# Patient Record
Sex: MALE | Race: AMERICAN INDIAN OR ALASKA NATIVE
[De-identification: names, ages, dates, MRNs, and addresses within clinical notes are randomized per-mention and may not be internally consistent; named-entity substitution may affect disease eponyms.]

---

## 2018-06-13 ENCOUNTER — HOSPITAL ENCOUNTER (EMERGENCY)
Dept: HOSPITAL 42 - ED | Age: 33
LOS: 1 days | Discharge: HOME | End: 2018-06-14
Payer: SELF-PAY

## 2018-06-13 VITALS — TEMPERATURE: 98.3 F

## 2018-06-13 DIAGNOSIS — F32.9: Primary | ICD-10-CM

## 2018-06-13 LAB
ALBUMIN SERPL-MCNC: 4.4 G/DL (ref 3–4.8)
ALBUMIN/GLOB SERPL: 1.4 {RATIO} (ref 1.1–1.8)
ALT SERPL-CCNC: 32 U/L (ref 7–56)
APAP SERPL-MCNC: < 10 UG/ML (ref 10–20)
APPEARANCE UR: CLEAR
AST SERPL-CCNC: 22 U/L (ref 17–59)
BASOPHILS # BLD AUTO: 0.01 K/MM3 (ref 0–2)
BASOPHILS NFR BLD: 0.1 % (ref 0–3)
BILIRUB UR-MCNC: NEGATIVE MG/DL
BUN SERPL-MCNC: 14 MG/DL (ref 7–21)
CALCIUM SERPL-MCNC: 9.4 MG/DL (ref 8.4–10.5)
COLOR UR: YELLOW
EOSINOPHIL # BLD: 0 10*3/UL (ref 0–0.7)
EOSINOPHIL NFR BLD: 0.3 % (ref 1.5–5)
ERYTHROCYTE [DISTWIDTH] IN BLOOD BY AUTOMATED COUNT: 12.3 % (ref 11.5–14.5)
GFR NON-AFRICAN AMERICAN: > 60
GLUCOSE UR STRIP-MCNC: NEGATIVE MG/DL
GRANULOCYTES # BLD: 6.56 10*3/UL (ref 1.4–6.5)
GRANULOCYTES NFR BLD: 74.3 % (ref 50–68)
HGB BLD-MCNC: 14.2 G/DL (ref 14–18)
LEUKOCYTE ESTERASE UR-ACNC: NEGATIVE LEU/UL
LYMPHOCYTES # BLD: 1.9 10*3/UL (ref 1.2–3.4)
LYMPHOCYTES NFR BLD AUTO: 21.3 % (ref 22–35)
MCH RBC QN AUTO: 29.2 PG (ref 25–35)
MCHC RBC AUTO-ENTMCNC: 33.6 G/DL (ref 31–37)
MCV RBC AUTO: 86.7 FL (ref 80–105)
MONOCYTES # BLD AUTO: 0.4 10*3/UL (ref 0.1–0.6)
MONOCYTES NFR BLD: 4 % (ref 1–6)
PH UR STRIP: 6 [PH] (ref 4.7–8)
PLATELET # BLD: 238 10^3/UL (ref 120–450)
PMV BLD AUTO: 11.5 FL (ref 7–11)
PROT UR STRIP-MCNC: NEGATIVE MG/DL
RBC # BLD AUTO: 4.87 10^6/UL (ref 3.5–6.1)
RBC # UR STRIP: NEGATIVE /UL
SALICYLATES SERPL-MCNC: < 1 MG/DL (ref 2–20)
SP GR UR STRIP: 1.01 (ref 1–1.03)
UROBILINOGEN UR STRIP-ACNC: 0.2 E.U./DL
WBC # BLD AUTO: 8.8 10^3/UL (ref 4.5–11)

## 2018-06-13 PROCEDURE — 85025 COMPLETE CBC W/AUTO DIFF WBC: CPT

## 2018-06-13 PROCEDURE — 99285 EMERGENCY DEPT VISIT HI MDM: CPT

## 2018-06-13 PROCEDURE — 71045 X-RAY EXAM CHEST 1 VIEW: CPT

## 2018-06-13 PROCEDURE — 93005 ELECTROCARDIOGRAM TRACING: CPT

## 2018-06-13 PROCEDURE — 81003 URINALYSIS AUTO W/O SCOPE: CPT

## 2018-06-13 PROCEDURE — 80053 COMPREHEN METABOLIC PANEL: CPT

## 2018-06-13 PROCEDURE — 90791 PSYCH DIAGNOSTIC EVALUATION: CPT

## 2018-06-13 PROCEDURE — 83735 ASSAY OF MAGNESIUM: CPT

## 2018-06-13 NOTE — ED PDOC
Arrival/HPI





- General


Historian: Patient





<Dasia Gilman A - Last Filed: 06/14/18 01:42>





<George Orellana - Last Filed: 06/14/18 06:48>





- General


Chief Complaint: Psychiatric Evaluation


Time Seen by Provider: 06/13/18 15:06





- History of Present Illness


Narrative History of Present Illness (Text): 





06/13/18 17:42


34yo male with no past medical history who present to Emergency department for 

psychiatric evaluation. Patient report depression and suicidal thoughts 

secondary to his financial situation. States he know that he can't commit 

suicide, but feels he needs therapy. He denies homicidal ideation, hallucination

, somatic complaint, any other complaint. (Dasia Gilman A)





Past Medical History





- Provider Review


Nursing Documentation Reviewed: Yes





- Infectious Disease


Hx of Infectious Diseases: None





- Cardiac


Hx Cardiac Disorders: No


Hx Hypertension: No





- Pulmonary


Hx Tuberculosis: No





- Neurological


HX Cerebrovascular Accident: No


Hx Seizures: No





- Hematological/Oncological


Hx Cancer: No





- Genitourinary/Gynecological


Hx Sexually Transmitted Diseases: No





- Psychiatric


Hx Substance Use: No





- Anesthesia


Hx Anesthesia: No





<Dasia Gilman A - Last Filed: 06/14/18 01:42>





Family/Social History





- Physician Review


Nursing Documentation Reviewed: Yes


Family/Social History: Unknown Family HX


Smoking Status: Unknown If Ever Smoked


Hx Alcohol Use: Yes


Frequency of alcohol use: Socially


Hx Substance Use: No


Substance used: MARIJUANA





<Dasia Gilman A - Last Filed: 06/14/18 01:42>





Allergies/Home Meds





<Dasia Gilman A - Last Filed: 06/14/18 01:42>





<George Orellana - Last Filed: 06/14/18 06:48>


Allergies/Adverse Reactions: 


Allergies





aspirin Allergy (Verified 06/13/18 15:16)


 RASH








Home Medications: 


 Home Meds











 Medication  Instructions  Recorded  Confirmed


 


No Known Home Med  06/13/18 06/13/18














Review of Systems





- Physician Review


All systems were reviewed & negative as marked: Yes





- Review of Systems


Constitutional: Normal


Eyes: Normal


ENT: Normal


Respiratory: Normal


Cardiovascular: Normal


Gastrointestinal: Normal


Genitourinary Male: Normal


Musculoskeletal: Normal


Skin: Normal


Neurological: Normal


Endocrine: Normal


Hemo/Lymphatic: Normal


Psychiatric: Depression, Suicidal Ideation





<Dasia Gilman A - Last Filed: 06/14/18 01:42>





Physical Exam


Vital Signs Reviewed: Yes


Temperature: Afebrile


Blood Pressure: Normal


Pulse: Regular


Respiratory Rate: Normal


Appearance: Positive for: Well-Appearing, Non-Toxic, Comfortable


Pain Distress: None


Mental Status: Positive for: Alert and Oriented X 3





- Systems Exam


Head: Present: Atraumatic, Normocephalic


Pupils: Present: PERRL


Extroacular Muscles: Present: EOMI


Conjunctiva: Present: Normal


Mouth: Present: Moist Mucous Membranes


Neck: Present: Normal Range of Motion


Respiratory/Chest: Present: Clear to Auscultation, Good Air Exchange.  No: 

Respiratory Distress, Accessory Muscle Use


Cardiovascular: Present: Regular Rate and Rhythm, Normal S1, S2.  No: Murmurs


Abdomen: No: Tenderness, Distention, Peritoneal Signs


Back: Present: Normal Inspection


Upper Extremity: Present: Normal Inspection.  No: Cyanosis, Edema


Lower Extremity: Present: Normal Inspection.  No: Edema


Neurological: Present: GCS=15, CN II-XII Intact, Speech Normal


Skin: Present: Warm, Dry, Normal Color.  No: Rashes


Psychiatric: Present: Alert, Oriented x 3, Normal Insight, Normal Concentration

, Depressed Mood





<Dasia Gilman A - Last Filed: 06/14/18 01:42>





Vital Signs











  Temp Pulse Resp BP Pulse Ox


 


 06/14/18 05:11   80  17  112/63  100


 


 06/14/18 00:18   71  18  115/68  100


 


 06/13/18 22:15   65  17  119/63  99


 


 06/13/18 19:00   68  17  122/74  99


 


 06/13/18 15:11  98.3 F  77  18  112/78  99














Medical Decision Making





<Dasia Gilman A - Last Filed: 06/14/18 01:42>





<George Orellana - Last Filed: 06/14/18 06:48>


ED Course and Treatment: 





06/13/18 17:45


PT present to Emergency department for stated history. He was hemodynamically 

stable. Lab was reviewed and pt was medically cleared for psychiatric 

evaluation.





EKG NSR @76bpm


Chest xray NAD





Pt was seen by the PES screener Sayra and he declined to signed in voluntarily

, so he will be screened by [facility].








06/14/18 01:42


PT remain calm and comfortable in Emergency department. He is pending screening 

from [facility].





Case was endorsed to Dr. Orellana to f/u and dispo accordingly. (Dasia Gilman)








06/14/18 06:46


Pt. was screened by [facility].Pt. not suicidal.Cleared for discharge.Pt. reevaluated 

as well by KACEY Null.Conferred with psychiatrist  who agreed on 

discharge and follow up [facility].Pt. in agreement (

George Orellana)





- Lab Interpretations


Lab Results: 











 06/13/18 16:58 





 06/13/18 16:58 





 Lab Results





06/13/18 16:58: Alcohol, Quantitative < 10


06/13/18 16:58: Salicylates < 1 L, Acetaminophen < 10.0 L


06/13/18 16:58: Urine Opiates Screen Negative, Urine Methadone Screen Negative, 

Ur Barbiturates Screen Negative, Ur Phencyclidine Scrn Negative, Ur 

Amphetamines Screen Negative, U Benzodiazepines Scrn Negative, U Oth Cocaine 

Metabols Negative, U Cannabinoids Screen Negative


06/13/18 16:58: Sodium 142, Potassium 4.0, Chloride 103, Carbon Dioxide 27, 

Anion Gap 16, BUN 14, Creatinine 1.1, Est GFR (African Amer) > 60, Est GFR (Non-

Af Amer) > 60, Random Glucose 95, Calcium 9.4, Magnesium 1.9, Total Bilirubin 

1.0, AST 22, ALT 32, Alkaline Phosphatase 67, Total Protein 7.6, Albumin 4.4, 

Globulin 3.2, Albumin/Globulin Ratio 1.4


06/13/18 16:58: Urine Color Yellow, Urine Appearance Clear, Urine pH 6.0, Ur 

Specific Gravity 1.015, Urine Protein Negative, Urine Glucose (UA) Negative, 

Urine Ketones Trace H, Urine Blood Negative, Urine Nitrate Negative, Urine 

Bilirubin Negative, Urine Urobilinogen 0.2, Ur Leukocyte Esterase Negative


06/13/18 16:58: WBC 8.8, RBC 4.87, Hgb 14.2, Hct 42.2, MCV 86.7, MCH 29.2, MCHC 

33.6, RDW 12.3, Plt Count 238, MPV 11.5 H, Gran % 74.3 H, Lymph % (Auto) 21.3 L

, Mono % (Auto) 4.0, Eos % (Auto) 0.3 L, Baso % (Auto) 0.1, Gran # 6.56 H, 

Lymph # (Auto) 1.9, Mono # (Auto) 0.4, Eos # (Auto) 0.0, Baso # (Auto) 0.01











- RAD Interpretation


Radiology Orders: 











06/13/18 15:32


CHEST PORTABLE [RAD] Stat 














Disposition/Present on Arrival





- Present on Arrival


Any Indicators Present on Arrival: No


History of DVT/PE: No


History of Uncontrolled Diabetes: No


Urinary Catheter: No


History of Decub. Ulcer: No


History Surgical Site Infection Following: None





- Disposition


Have Diagnosis and Disposition been Completed?: Yes





<Dasia Gilman - Last Filed: 06/14/18 01:42>





- Present on Arrival


Any Indicators Present on Arrival: No


History of DVT/PE: No


History of Uncontrolled Diabetes: No


Urinary Catheter: No


History of Decub. Ulcer: No


History Surgical Site Infection Following: None





- Disposition


Have Diagnosis and Disposition been Completed?: Yes


Disposition Time: 06:45


Patient Plan: Discharge





<George Orellana - Last Filed: 06/14/18 06:48>





- Disposition


Diagnosis: 


 Depression





Disposition: HOME/ ROUTINE


Patient Problems: 


 Current Active Problems











Problem Status Onset


 


Depression Acute  


 


Suicidal ideation Acute  











Condition: STABLE


Discharge Instructions (ExitCare):  Depression, Adult (DC)


Additional Instructions: 


Follow up [facility] clinic as instructed


Referrals: 


[facility] - Follow up with primary


Forms:  [employer] (English)

## 2018-06-14 VITALS
SYSTOLIC BLOOD PRESSURE: 119 MMHG | OXYGEN SATURATION: 98 % | DIASTOLIC BLOOD PRESSURE: 76 MMHG | HEART RATE: 72 BPM | RESPIRATION RATE: 16 BRPM

## 2018-06-14 NOTE — CARD
--------------- APPROVED REPORT --------------





EKG Measurement

Heart [id number]

[id number]

[id number]

[id number]

[id number]



<Conclusion>

Normal sinus rhythm

Nonspecific T wave abnormality

Abnormal ECG